# Patient Record
Sex: MALE | Race: ASIAN | URBAN - METROPOLITAN AREA
[De-identification: names, ages, dates, MRNs, and addresses within clinical notes are randomized per-mention and may not be internally consistent; named-entity substitution may affect disease eponyms.]

---
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
## 2021-01-01 NOTE — PATIENT INSTRUCTIONS
Great to see you today!  Keep up the great work with all your healthy habits!  Good luck cutting back on screen time!  If you ever get stuck, let us know and we are happy to provide more support or help if needed.  Have a wonderful summer!    A friendly reminder don't forget your Flu shots in September 2018  
[employer]